# Patient Record
Sex: FEMALE | Race: WHITE | NOT HISPANIC OR LATINO | Employment: OTHER | URBAN - METROPOLITAN AREA
[De-identification: names, ages, dates, MRNs, and addresses within clinical notes are randomized per-mention and may not be internally consistent; named-entity substitution may affect disease eponyms.]

---

## 2021-05-18 ENCOUNTER — HOSPITAL ENCOUNTER (OUTPATIENT)
Dept: GENERAL RADIOLOGY | Facility: HOSPITAL | Age: 61
Discharge: HOME | End: 2021-05-18
Admitting: ORTHOPAEDIC SURGERY

## 2021-05-18 DIAGNOSIS — M25.532 LEFT WRIST PAIN: ICD-10-CM

## 2021-05-18 PROCEDURE — 73100 X-RAY EXAM OF WRIST: CPT

## 2021-06-29 ENCOUNTER — HOSPITAL ENCOUNTER (OUTPATIENT)
Dept: GENERAL RADIOLOGY | Facility: HOSPITAL | Age: 61
Discharge: HOME | End: 2021-06-29
Admitting: ORTHOPAEDIC SURGERY

## 2021-06-29 DIAGNOSIS — S52.532A CLOSED TRAUMATIC DISPLACED COLLES' FRACTURE OF LEFT RADIUS: ICD-10-CM

## 2021-06-29 PROCEDURE — 73100 X-RAY EXAM OF WRIST: CPT

## 2021-07-27 ENCOUNTER — HOSPITAL ENCOUNTER (OUTPATIENT)
Dept: GENERAL RADIOLOGY | Facility: HOSPITAL | Age: 61
Discharge: HOME | End: 2021-07-27
Admitting: ORTHOPAEDIC SURGERY

## 2021-07-27 DIAGNOSIS — S52.532D CLOSED COLLES' FRACTURE OF LEFT RADIUS WITH ROUTINE HEALING: ICD-10-CM

## 2021-07-27 PROCEDURE — 73100 X-RAY EXAM OF WRIST: CPT

## 2024-10-01 ENCOUNTER — OFFICE VISIT (OUTPATIENT)
Age: 64
End: 2024-10-01
Payer: MEDICARE

## 2024-10-01 VITALS
DIASTOLIC BLOOD PRESSURE: 68 MMHG | HEART RATE: 56 BPM | SYSTOLIC BLOOD PRESSURE: 155 MMHG | WEIGHT: 130 LBS | BODY MASS INDEX: 20.98 KG/M2 | OXYGEN SATURATION: 98 %

## 2024-10-01 DIAGNOSIS — I73.9 PVD (PERIPHERAL VASCULAR DISEASE) WITH CLAUDICATION: ICD-10-CM

## 2024-10-01 DIAGNOSIS — I65.22 STENOSIS OF LEFT CAROTID ARTERY: Primary | ICD-10-CM

## 2024-10-01 PROCEDURE — 1160F RVW MEDS BY RX/DR IN RCRD: CPT | Performed by: STUDENT IN AN ORGANIZED HEALTH CARE EDUCATION/TRAINING PROGRAM

## 2024-10-01 PROCEDURE — 99204 OFFICE O/P NEW MOD 45 MIN: CPT | Performed by: STUDENT IN AN ORGANIZED HEALTH CARE EDUCATION/TRAINING PROGRAM

## 2024-10-01 PROCEDURE — 1159F MED LIST DOCD IN RCRD: CPT | Performed by: STUDENT IN AN ORGANIZED HEALTH CARE EDUCATION/TRAINING PROGRAM

## 2024-10-01 RX ORDER — LOSARTAN POTASSIUM 100 MG/1
1 TABLET ORAL DAILY
COMMUNITY
Start: 2024-09-18

## 2024-10-01 RX ORDER — SIMVASTATIN 5 MG
5 TABLET ORAL NIGHTLY
Qty: 90 TABLET | Refills: 4 | Status: SHIPPED | OUTPATIENT
Start: 2024-10-01

## 2024-10-01 NOTE — PROGRESS NOTES
"Chief Complaint  Carotid Artery Disease (NP for carotid stenosis )    Patient referral for carotid stenosis    Subjective        Ilene Shipley presents to [facility] VASCULAR SURGERY  History of Present Illness    Patient is a kim 64-year-old lady with peripheral arterial disease previously underwent right iliofemoral endarterectomy with right iliac stent placement by Dr. Damon on 6/22/2016.     She is referred back to us now by Loren ROY due to new findings of carotid artery stenosis.  The patient had a carotid duplex performed at [facility] on 8/16/2024 which showed 50 to 69% stenosis in the left proximal ICA with no significant stenosis in the right ICA.     The patient reports she has not seen Dr. Damon since 2016.  She feels well today with no acute complaints.  She does not have any significant claudication.  She had tissue loss on her previous vascular intervention was performed and this has resolved.    She has no prior diagnosis of stroke.  She denies any episodes of unilateral arm or leg weakness, numbness, slurred speech, vision loss, or any other symptoms concerning for stroke.    Carotid duplex was ordered by her primary care doctor after noticing a carotid bruit.    Objective   Vital Signs:  /68 (BP Location: Left arm, Patient Position: Sitting, Cuff Size: Adult)   Pulse 56   Wt 59 kg (130 lb)   SpO2 98%   BMI 20.98 kg/m²   Estimated body mass index is 20.98 kg/m² as calculated from the following:    Height as of 6/22/16: 167.6 cm (66\").    Weight as of this encounter: 59 kg (130 lb).       BMI cannot be calculated due to outdated height or weight values.  Please input a current height/weight in Vitals and re-renter BMIFOLLOWUP in Note to pull in correct documentation based on BMI range.           Physical Exam   Result Review :                     Assessment and Plan     Kim 64-year-old lady with peripheral arterial disease and left " carotid stenosis who is referred as a new patient to me for evaluation.  Carotid stenosis appears to be 50 to 69% by duplex performed at outlying facility  Her carotid stenosis is asymptomatic and her peripheral arterial disease is currently asymptomatic as well.  She is currently on 325 mg aspirin daily.  She is 1/2 pack/day smoker.  I did discuss with her that the best thing she can do for herself in terms of her risk of stroke, worsening peripheral arterial disease, or heart attack would be to stop smoking and offered to refer her to the tobacco cessation clinic, or provide her with Chantix or nicotine patches myself and she declined all of these.  She does not seem to have much interest in smoking cessation at this time.      Currently there is no indication for intervention for her carotid disease or peripheral arterial disease.  I discussed the benefit of being on a statin with the patient.  She is worried about muscle cramps due to having had muscle cramps with hydrochlorothiazide.  We decided to start her on a very low-dose of simvastatin, 5 mg daily.  I have e-prescribed this to her pharmacy    Will see her back in 6 months with ABIs and carotid duplex for surveillance.    Patient verbalized understanding and agreement with this plan.    Diagnoses and all orders for this visit:    1. Stenosis of left carotid artery (Primary)  Assessment & Plan:  50-69% by duplex at [facility] on 8/16/24.       Orders:  -     simvastatin (ZOCOR) 5 MG tablet; Take 1 tablet by mouth Every Night.  Dispense: 90 tablet; Refill: 4  -     Duplex Carotid Ultrasound CAR; Future    2. PVD (peripheral vascular disease) with claudication  Assessment & Plan:  S/p iliac stent and R iliofemoral endarterectomy with re-do iliac stents by Dr. Damon in 2016.       Orders:  -     Doppler Arterial Multi Level Lower Extremity - Bilateral CAR; Future              Patient BMI noted. Educational material for patient for health risks  of being overweight added to patient's after visit summary.           Follow Up     Return in about 6 months (around 4/1/2025).  Patient was given instructions and counseling regarding her condition or for health maintenance advice. Please see specific information pulled into the AVS if appropriate.

## 2025-04-08 NOTE — PROGRESS NOTES
[facility] VASCULAR SURGERY    Chief Complaint  Carotid Artery Disease and Peripheral Vascular Disease    Subjective          History of Present Illness  Ilene Shipley is a 65 y.o. female with carotid stenosis and PAD. She presents to the office today for follow up.  She is accompanied by her .  She is followed by Dr. Wagoner. She has undergone the following vascular surgery interventions:    Right iliofemoral endarterectomy with right iliac stent placement by Dr. Damon on 6/22/2016    She denies any hospitalizations or new diagnosis since we last saw her. She denies any symptoms of TIA/CVA, including amaurosis fugax, slurred speech, or unilateral paresthesias or paralysis.  She is ambulatory, she tells me if she walks too long of a distance then her bilateral hips start to hurt.  1 does not bother her more than the other.  She tells me she is able to ride a bike and walk a little over half a mile before needing to stop and rest for a short time and then she is able to go again.  She denies any lifestyle limiting claudication.  She denies any rest pain or wounds to her lower extremities.  She is maintained on aspirin and a statin. She reports compliance with her medications. She is a smoker.  She is smoking about three-quarter pack of cigarettes daily.    Review of Systems   Musculoskeletal:  Negative for myalgias.   Skin:  Negative for skin lesions and wound.   Neurological:  Negative for facial asymmetry, speech difficulty and weakness.          Allergies: Patient has no known allergies.    Prior to Admission medications    Medication Sig Start Date End Date Taking? Authorizing Provider   aspirin 81 MG EC tablet Take 1 tablet by mouth Daily.    Pallavi Vincent MD   atorvastatin (LIPITOR) 80 MG tablet Take 1 tablet by mouth Daily. May take preop    Pallavi Vincent MD   hydroCHLOROthiazide (HYDRODIURIL) 25 MG tablet Take 1 tablet by mouth Daily. dont take preop    Carlton  "MD Pallavi   Misc Natural Products (PROSTATE HEALTH PO) Take  by mouth Daily. Prostate Health Capsule (si capsule once per day )    Provider, MD Pallavi         Objective   Vital Signs:  /62   Pulse 71   Ht 167.6 cm (65.98\")   Wt 59 kg (130 lb)   SpO2 97%   BMI 20.99 kg/m²   Estimated body mass index is 20.99 kg/m² as calculated from the following:    Height as of this encounter: 167.6 cm (65.98\").    Weight as of this encounter: 59 kg (130 lb).       Physical Exam  Vitals reviewed.   Constitutional:       General: She is not in acute distress.     Appearance: She is not ill-appearing.   Cardiovascular:      Rate and Rhythm: Normal rate.      Pulses:           Radial pulses are 2+ on the right side and 2+ on the left side.        Dorsalis pedis pulses are detected w/ Doppler on the right side and detected w/ Doppler on the left side.        Posterior tibial pulses are detected w/ Doppler on the right side and detected w/ Doppler on the left side.   Pulmonary:      Effort: Pulmonary effort is normal. No respiratory distress.   Skin:     General: Skin is warm and dry.   Neurological:      General: No focal deficit present.      Mental Status: She is alert and oriented to person, place, and time.      GCS: GCS eye subscore is 4. GCS verbal subscore is 5. GCS motor subscore is 6.          Result Review :    The following data was reviewed by: MANUELA Patel on 04/10/2025:  Doppler Arterial Multi Level Lower Extremity - Bilateral CAR (04/10/2025 10:40)   Right 0.69, TBI 0.28, digit pressure 39 mmHg  Left: 0.42, TBI 0.17, digit pressure 23 mmHg    Duplex Carotid Ultrasound CAR (04/10/2025 10:40)  Right: Over 70% stenosis in the right ICA, peak systolic velocity of 668 cm/s, ICA/CCA ratio of 8.2  Left: 50 to 69% stenosis in the left ICA, peak systolic velocity of 279 cm/s    Progress Notes by Stef Wagoner II, MD (10/01/2024 09:45)      Assessment and Plan     Diagnoses and all orders for " this visit:    1. Bilateral carotid artery stenosis (Primary)  -     CT Angiogram Neck; Future  -     CT Angiogram Head; Future    2. PVD (peripheral vascular disease) with claudication  -     Doppler Ankle Brachial Index Single Level CAR; Future    3. Tobacco abuse    BMI is within normal parameters. No other follow-up for BMI required.          Ilene Shipley is a 65 y.o. female with carotid stenosis and PAD. She denies symptoms of TIA/CVA. Her most recent carotid duplex shows over 70% stenosis in the right ICA and 50 to 69% stenosis in the left ICA, antegrade flow in the bilateral vertebral arteries.  Compared to previous imaging she has had a significant progression of carotid disease on the right, peak systolic velocity of 668 cm/s with a ratio of 8.2.  Recommend proceeding with a CTA of the neck to further evaluate her carotid vasculature, she should follow-up with Dr. Wagoner once this has been obtained.  Patient is agreeable to this plan. Surgery is not warranted unless stenosis reaches or exceeds 70% or patient becomes symptomatic.  We reviewed the signs and symptoms of stroke, she was instructed to seek emergency medical care if she experiences any of these. She verbalizes understanding.  In regards to her PAD, she is 9 years status post right iliac stent placement.  She denies any lower extremity ischemic symptoms.  Recent ANGY shows moderate arterial insufficiency in the right lower extremity at 0.69 with digit pressure of 39 mmHg, severe arterial insufficiency on the left at 0.42 with digit pressure of 23 mmHg.  Her ANGY on the right remained stable, ANGY on the left has decreased from 0.84, she remains completely asymptomatic.  In absence of ischemic symptoms we will continue to treat this medically and follow-up with an ANGY in 1 year.  She is maintained on appropriate antiplatelet, statin, and antihypertensive medications which we recommend she continue.  We discussed the importance of complete smoking  cessation, she is not completely ready at this time.  She was instructed to call our office if she had any questions or concerns.     Follow Up     Return in about 1 month (around 5/10/2025) for CTA head and neck and follow up with Dr. Wagoner.    Patient was given instructions and counseling regarding her condition or for health maintenance advice. Please see specific information pulled into the AVS if appropriate.     Carmen Garcia, APRN

## 2025-04-10 ENCOUNTER — HOSPITAL ENCOUNTER (OUTPATIENT)
Dept: CARDIOLOGY | Facility: HOSPITAL | Age: 65
Discharge: HOME | End: 2025-04-10
Payer: MEDICARE

## 2025-04-10 ENCOUNTER — OFFICE VISIT (OUTPATIENT)
Age: 65
End: 2025-04-10
Payer: MEDICARE

## 2025-04-10 VITALS
DIASTOLIC BLOOD PRESSURE: 62 MMHG | OXYGEN SATURATION: 97 % | HEART RATE: 71 BPM | BODY MASS INDEX: 20.89 KG/M2 | SYSTOLIC BLOOD PRESSURE: 136 MMHG | HEIGHT: 66 IN | WEIGHT: 130 LBS

## 2025-04-10 DIAGNOSIS — Z72.0 TOBACCO ABUSE: ICD-10-CM

## 2025-04-10 DIAGNOSIS — I73.9 PVD (PERIPHERAL VASCULAR DISEASE) WITH CLAUDICATION: ICD-10-CM

## 2025-04-10 DIAGNOSIS — I65.23 BILATERAL CAROTID ARTERY STENOSIS: Primary | ICD-10-CM

## 2025-04-10 DIAGNOSIS — I65.22 STENOSIS OF LEFT CAROTID ARTERY: ICD-10-CM

## 2025-04-10 PROCEDURE — 93923 UPR/LXTR ART STDY 3+ LVLS: CPT

## 2025-04-10 PROCEDURE — 93880 EXTRACRANIAL BILAT STUDY: CPT

## 2025-04-11 LAB
BH CV VAS PRELIMINARY FINDINGS SCRIPTING: 1
BH CV XLRA MEAS LEFT DIST CCA EDV: 37.6 CM/SEC
BH CV XLRA MEAS LEFT DIST CCA PSV: 126 CM/SEC
BH CV XLRA MEAS LEFT DIST ICA EDV: -40 CM/SEC
BH CV XLRA MEAS LEFT DIST ICA PSV: -115 CM/SEC
BH CV XLRA MEAS LEFT ICA/CCA RATIO: -2.21
BH CV XLRA MEAS LEFT MID ICA EDV: -38.4 CM/SEC
BH CV XLRA MEAS LEFT MID ICA PSV: -182 CM/SEC
BH CV XLRA MEAS LEFT PROX CCA EDV: 31.4 CM/SEC
BH CV XLRA MEAS LEFT PROX CCA PSV: 100 CM/SEC
BH CV XLRA MEAS LEFT PROX ECA PSV: -241 CM/SEC
BH CV XLRA MEAS LEFT PROX ICA EDV: -78.2 CM/SEC
BH CV XLRA MEAS LEFT PROX ICA PSV: -279 CM/SEC
BH CV XLRA MEAS LEFT PROX SCLA PSV: 184 CM/SEC
BH CV XLRA MEAS LEFT VERTEBRAL A EDV: -9.2 CM/SEC
BH CV XLRA MEAS LEFT VERTEBRAL A PSV: -31.6 CM/SEC
BH CV XLRA MEAS RIGHT DIST CCA EDV: 18.6 CM/SEC
BH CV XLRA MEAS RIGHT DIST CCA PSV: 73.9 CM/SEC
BH CV XLRA MEAS RIGHT DIST ICA EDV: -53.5 CM/SEC
BH CV XLRA MEAS RIGHT DIST ICA PSV: -170 CM/SEC
BH CV XLRA MEAS RIGHT ICA/CCA RATIO: 8.2
BH CV XLRA MEAS RIGHT PROX CCA EDV: 21.7 CM/SEC
BH CV XLRA MEAS RIGHT PROX CCA PSV: 82 CM/SEC
BH CV XLRA MEAS RIGHT PROX ECA EDV: -23.3 CM/SEC
BH CV XLRA MEAS RIGHT PROX ECA PSV: -156 CM/SEC
BH CV XLRA MEAS RIGHT PROX ICA EDV: 286 CM/SEC
BH CV XLRA MEAS RIGHT PROX ICA PSV: 668 CM/SEC
BH CV XLRA MEAS RIGHT PROX SCLA PSV: 127 CM/SEC
BH CV XLRA MEAS RIGHT VERTEBRAL A EDV: -31.4 CM/SEC
BH CV XLRA MEAS RIGHT VERTEBRAL A PSV: -95.6 CM/SEC
LEFT ARM BP: 135 MMHG
RIGHT ARM BP: 137 MMHG

## 2025-04-14 LAB
BH CV LOWER ARTERIAL LEFT ABI RATIO: 0.42
BH CV LOWER ARTERIAL LEFT CALF RATIO: 0.55
BH CV LOWER ARTERIAL LEFT DORSALIS PEDIS SYS MAX: 55
BH CV LOWER ARTERIAL LEFT GREAT TOE SYS MAX: 23
BH CV LOWER ARTERIAL LEFT LOW THIGH RATIO: 0.53
BH CV LOWER ARTERIAL LEFT LOW THIGH SYS MAX: 72
BH CV LOWER ARTERIAL LEFT POPLITEAL SYS MAX: 75
BH CV LOWER ARTERIAL LEFT POST TIBIAL SYS MAX: 58
BH CV LOWER ARTERIAL LEFT TBI RATIO: 0.17
BH CV LOWER ARTERIAL RIGHT ABI RATIO: 0.69
BH CV LOWER ARTERIAL RIGHT CALF RATIO: 0.73
BH CV LOWER ARTERIAL RIGHT DORSALIS PEDIS SYS MAX: 79
BH CV LOWER ARTERIAL RIGHT GREAT TOE SYS MAX: 39
BH CV LOWER ARTERIAL RIGHT LOW THIGH RATIO: 0.71
BH CV LOWER ARTERIAL RIGHT LOW THIGH SYS MAX: 97
BH CV LOWER ARTERIAL RIGHT POPLITEAL SYS MAX: 100
BH CV LOWER ARTERIAL RIGHT POST TIBIAL SYS MAX: 94
BH CV LOWER ARTERIAL RIGHT TBI RATIO: 0.28
UPPER ARTERIAL LEFT ARM BRACHIAL SYS MAX: 135
UPPER ARTERIAL RIGHT ARM BRACHIAL SYS MAX: 137

## 2025-05-10 ENCOUNTER — HOSPITAL ENCOUNTER (OUTPATIENT)
Dept: CT IMAGING | Facility: HOSPITAL | Age: 65
Discharge: HOME | End: 2025-05-10
Payer: MEDICARE

## 2025-05-10 DIAGNOSIS — I65.23 BILATERAL CAROTID ARTERY STENOSIS: ICD-10-CM

## 2025-05-10 PROCEDURE — 70498 CT ANGIOGRAPHY NECK: CPT

## 2025-05-10 PROCEDURE — 70496 CT ANGIOGRAPHY HEAD: CPT

## 2025-05-10 PROCEDURE — 25510000001 IOPAMIDOL PER 1 ML: Performed by: NURSE PRACTITIONER

## 2025-05-10 RX ORDER — IOPAMIDOL 755 MG/ML
100 INJECTION, SOLUTION INTRAVASCULAR
Status: COMPLETED | OUTPATIENT
Start: 2025-05-10 | End: 2025-05-10

## 2025-05-10 RX ADMIN — IOPAMIDOL 100 ML: 755 INJECTION, SOLUTION INTRAVENOUS at 08:31

## 2025-05-20 ENCOUNTER — OFFICE VISIT (OUTPATIENT)
Age: 65
End: 2025-05-20
Payer: MEDICARE

## 2025-05-20 VITALS
HEIGHT: 65 IN | SYSTOLIC BLOOD PRESSURE: 153 MMHG | BODY MASS INDEX: 21.66 KG/M2 | WEIGHT: 130 LBS | DIASTOLIC BLOOD PRESSURE: 69 MMHG

## 2025-05-20 DIAGNOSIS — I65.22 STENOSIS OF LEFT CAROTID ARTERY: ICD-10-CM

## 2025-05-20 DIAGNOSIS — I65.23 BILATERAL CAROTID ARTERY STENOSIS: Primary | ICD-10-CM

## 2025-05-20 DIAGNOSIS — I67.1 CEREBRAL ANEURYSM WITHOUT RUPTURE: ICD-10-CM

## 2025-05-20 RX ORDER — ASPIRIN 325 MG
325 TABLET ORAL DAILY
Qty: 903 TABLET | Refills: 3 | Status: SHIPPED | OUTPATIENT
Start: 2025-05-20

## 2025-05-20 RX ORDER — SIMVASTATIN 5 MG
5 TABLET ORAL NIGHTLY
Qty: 90 TABLET | Refills: 4 | Status: SHIPPED | OUTPATIENT
Start: 2025-05-20

## 2025-05-20 NOTE — PROGRESS NOTES
"Chief Complaint  Carotid Artery Disease    Follow up for carotid stenosis    Subjective        Ilene Shipley presents to [facility] VASCULAR SURGERY  History of Present Illness    Patient is a pleasant 65-year-old lady with peripheral arterial disease previously underwent right iliofemoral endarterectomy with right iliac stent placement by Dr. Damon on 6/22/2016.     The patient saw nurse practitioner Carmen Garcia in April with surveillance carotid duplex that showed high-grade stenosis in the right internal carotid artery over 70%.    She returns today for follow-up with CTA neck.    Patient doing well today with no acute complaints.  No interval symptoms concerning for stroke.  No lifestyle-limiting claudication symptoms.  Denies episodes of unilateral arm or leg weakness numbness slurred speech vision loss facial droop    Objective   Vital Signs:  /69 (BP Location: Right arm)   Ht 164.6 cm (64.8\")   Wt 59 kg (130 lb)   BMI 21.76 kg/m²   Estimated body mass index is 21.76 kg/m² as calculated from the following:    Height as of this encounter: 164.6 cm (64.8\").    Weight as of this encounter: 59 kg (130 lb).       BMI is within normal parameters. No other follow-up for BMI required.           Physical Exam   NAD  Respirations unlabored  PIERCE  No gross neurologic deficits  CN II-XII grossly intact.   Result Review :                      Narrative & Impression  CTA NECK, CTA HEAD     Clinical History:  Carotid stenosis     Comparison:  Carotid duplex 8/15/2024     TECHNIQUE:  Helical images thoracic inlet to vertex  100 mL Isovue-370  Coronal, sagittal reformats. Multi planar MIPS  CT images acquired with automatic exposure control for dose reduction  DLP: 224 mGy-cm     Findings:  NASCET Criteria utilized     CTA NECK     Aortic arch: No aneurysm or dissection.     Great vessel origins: No stenosis.     CCAs: Bilateral multifocal mild stenoses.     Cervical ICAs:   Right side: Narrowest " "area 1.8 mm. Normal distal artery: 5.9 mm. 70% stenosis.  Left side: Narrowest area 1.9 mm. Normal distal artery: 4.5 mm. 60% stenosis.     Vertebral Arteries: Right side patent, dominant, unremarkable. Left side small caliber proximally with slightly decreased relative enhancement, with return to normal caliber and enhancement within V3 segment, with critical stenosis distal V4 segment.     Lung Apices: Emphysema. Scarring.  Thyroid: Unremarkable.  Nodes: No enlarged nodes.  Bones: No acute bony abnormality.        CTA HEAD:     Aneurysms: 5 mm lesion right M1/M2 bifurcation.     Intracranial ICAs: Patent, unremarkable.  ACAs and their distal branches: Patent, unremarkable.  A-Comm: Identified.  Patent, unremarkable.  MCAs and their distal branches: Patent, unremarkable.  Basilar artery: Patent, unremarkable.  PCAs and their distal branches: Patent. Right P1 congenitally absent; fetal origin P-comm supply.  P-Comms: Identified. Patent, unremarkable.        IMPRESSION:     CTA NECK:  1.70% stenosis right ICA.  2.60% stenosis left ICA.  3.Proximal left vertebral artery long segment severe stenosis and/or chronic dissection, but patent. Additional critical stenosis distal V4 segment.  4.Common carotid arteries with bilateral multifocal mild stenoses.     Stenosis measured by NASCET criteria.        CTA HEAD:  1.5 mm aneurysm right M1/M2 bifurcation. Recommend neurosurgery or neurointerventional consultation.  2.No acute findings.     Electronically Signed: Rui Salgado MD    5/11/2025 11:29 AM EDT    Workstation ID: [id number]    L ICA:                      R ICA:                From Up To Date \"Unruptured Intracranial Aneurysms\" article, accessed 5/20/25:           Assessment and Plan     Pleasant 65-year-old lady with peripheral arterial disease and carotid stenosis who is here for follow up with CTA neck after carotid duplex showed elevated velocities concerning for >70% stenosis.     I have independently " reviewed and interpreted the images from the patient's CTA of her head and neck.  By my measurements the patient has less than 70% stenosis in both of her internal carotid arteries.  I suspect the elevated velocities in the right internal carotid are primarily due to tortuosity.  I have included images of the scan with my measurements above for reference.    Also noted is a 5 mm M1/M2 bifurcation aneurysm.    Considering these are asymptomatic lesions and associated with intracranial aneurysm I would not recommend intervention for the carotid stenosis at this time.    Patient will need to see neurosurgery for evaluation of her intracranial aneuysm.    Will plan to see the patient again in 6 months with repeat carotid duplex    Patient should stay on her aspirin and simvastatin in the interim.  Refills of these medications were sent to her pharmacy, the [facility].        Diagnoses and all orders for this visit:    1. Bilateral carotid artery stenosis (Primary)  -     Duplex Carotid Ultrasound CAR; Future  -     aspirin 325 MG tablet; Take 1 tablet by mouth Daily. PER MD, PT TO STAY OF PRIOR TO SURG  Dispense: 903 tablet; Refill: 3    2. Cerebral aneurysm without rupture  -     Ambulatory Referral to Neurosurgery    3. Stenosis of left carotid artery  -     simvastatin (ZOCOR) 5 MG tablet; Take 1 tablet by mouth Every Night.  Dispense: 90 tablet; Refill: 4                Patient BMI noted. Educational material for patient for health risks of being overweight added to patient's after visit summary.           Follow Up     No follow-ups on file.  Patient was given instructions and counseling regarding her condition or for health maintenance advice. Please see specific information pulled into the AVS if appropriate.

## 2025-06-02 NOTE — PROGRESS NOTES
"Subjective   Patient ID: Ilene Shipley is a 65 y.o. female is being seen for consultation today at the request of Stef Wagoner II, MD for  - Cerebral aneurysm without rupture   New patient, referred by vasucular surgery   CT angiogram neck/head completed on 05/10/2025     Ilene Shipley reports no complaints today.  She reports she had cataract surgery a few years ago.      Subjective:   History of Present Illness  History of Present Illness      Ilene is a very pleasant 65-year-old female with a significant history of smoking who presented with findings of a CTA I was performed by vascular surgery for evaluation of carotid stenosis.  She underwent a screening Doppler by her primary care physician which found this stenosis and Dr. Wagoner ordered the CTA of the head and neck which revealed a right MCA aneurysm.    Patient is not Maldivian or Khmer, she does have a history of smoking, she does have a history of hypertension, she does not have any abuse of stimulants in the past.    The following portions of the patient's history were reviewed and updated as appropriate: allergies, current medications, past family history, past medical history, past social history, past surgical history, and problem list.    Objective     Vitals:    06/11/25 1029   BP: 140/74   BP Location: Right arm   Patient Position: Sitting   Cuff Size: Adult   Pulse: 52   Resp: 16   Temp: 97.6 °F (36.4 °C)   TempSrc: Temporal   SpO2: 100%   Weight: 54.4 kg (120 lb)   Height: 164.6 cm (64.82\")   PainSc: 0-No pain     Body mass index is 20.08 kg/m².    Lab Results   Component Value Date    WBC 9.41 06/23/2016    HGB 9.9 (L) 06/23/2016    HCT 29.8 (L) 06/23/2016    MCV 91.4 06/23/2016     06/23/2016     Lab Results   Component Value Date    GLUCOSE 125 (H) 06/23/2016    CALCIUM 8.3 (L) 06/23/2016     06/23/2016    K 4.1 06/23/2016    CO2 18.1 (L) 06/23/2016     06/23/2016    BUN 8 06/23/2016    CREATININE 0.65 06/23/2016    BCR " 12.3 06/23/2016    ANIONGAP 16.9 06/23/2016       Physical Exam:    NAD  A&O3  Face symmetric, pupils equal round and reactive to light, EOMI  Motor:   5/5 BUE  5/5 BLE  Gait normal  No pronator drift      Ilene Shipley  reports that she has been smoking cigarettes. She started smoking about 33 years ago. She has a 27.8 pack-year smoking history. She has been exposed to tobacco smoke. She does not have any smokeless tobacco history on file.      Independent Review of Radiographic Studies:      I personally reviewed the images from the following studies.  Results      CTA of the head and neck performed 5/10/2025 shows 70% stenosis of the right ICA, 60% stenosis of the left ICA.  There is proximal left vertebral artery long segment stenosis.  There is critical stenosis of the distal left V4 segment, the right vertebral artery is patent.  There is a 5 mm lesion at the M1 anterior temporal artery origin.    Medical Decision Making:      I spent 45 minutes caring for Ilene on this date of service. This time includes time spent by me in the following activities:preparing for the visit, reviewing tests, obtaining and/or reviewing a separately obtained history, performing a medically appropriate examination and/or evaluation , counseling and educating the patient/family/caregiver, ordering medications, tests, or procedures, documenting information in the medical record, independently interpreting results and communicating that information with the patient/family/caregiver, and care coordination      Assessment & Plan   Assessment & Plan      Assessment & Plan  Cerebral aneurysm without rupture    Orders:    IR Arch & 3 Vessel Head; Future    Obtain Informed Consent; Standing    Insert Peripheral IV; Standing    Saline Lock & Maintain IV Access; Standing    sodium chloride 0.9 % flush 10 mL    sodium chloride 0.9 % flush 10 mL    sodium chloride 0.9 % infusion 40 mL    lidocaine (LMX) 4 % cream 1 Application    Occlusion and  stenosis of bilateral carotid arteries    Orders:    IR Arch & 3 Vessel Head; Future      Return for To hospital for diagnostic cerebral angiogram.           This note was completed using Dragon Dictation software.

## 2025-06-11 ENCOUNTER — OFFICE VISIT (OUTPATIENT)
Dept: NEUROSURGERY | Facility: CLINIC | Age: 65
End: 2025-06-11
Payer: MEDICARE

## 2025-06-11 VITALS
HEIGHT: 65 IN | DIASTOLIC BLOOD PRESSURE: 74 MMHG | HEART RATE: 52 BPM | RESPIRATION RATE: 16 BRPM | TEMPERATURE: 97.6 F | OXYGEN SATURATION: 100 % | WEIGHT: 120 LBS | SYSTOLIC BLOOD PRESSURE: 140 MMHG | BODY MASS INDEX: 19.99 KG/M2

## 2025-06-11 DIAGNOSIS — I67.1 CEREBRAL ANEURYSM WITHOUT RUPTURE: Primary | ICD-10-CM

## 2025-06-11 DIAGNOSIS — I65.23 OCCLUSION AND STENOSIS OF BILATERAL CAROTID ARTERIES: ICD-10-CM

## 2025-06-11 RX ORDER — SODIUM CHLORIDE 9 MG/ML
40 INJECTION, SOLUTION INTRAVENOUS AS NEEDED
OUTPATIENT
Start: 2025-06-11

## 2025-06-11 RX ORDER — SODIUM CHLORIDE 0.9 % (FLUSH) 0.9 %
10 SYRINGE (ML) INJECTION EVERY 12 HOURS SCHEDULED
OUTPATIENT
Start: 2025-06-11

## 2025-06-11 RX ORDER — LIDOCAINE 40 MG/G
1 CREAM TOPICAL AS NEEDED
OUTPATIENT
Start: 2025-06-11

## 2025-06-11 RX ORDER — SODIUM CHLORIDE 0.9 % (FLUSH) 0.9 %
10 SYRINGE (ML) INJECTION AS NEEDED
OUTPATIENT
Start: 2025-06-11

## 2025-06-11 NOTE — ASSESSMENT & PLAN NOTE
Orders:    IR Arch & 3 Vessel Head; Future    Obtain Informed Consent; Standing    Insert Peripheral IV; Standing    Saline Lock & Maintain IV Access; Standing    sodium chloride 0.9 % flush 10 mL    sodium chloride 0.9 % flush 10 mL    sodium chloride 0.9 % infusion 40 mL    lidocaine (LMX) 4 % cream 1 Application

## 2025-06-20 NOTE — PROGRESS NOTES
"Subjective   Patient ID: Ilene Shipley is a 65 y.o. female is here today for follow-up for  -Cerebral angiogram on 07/03/2025    Dx-Cerebral aneurysm without rupture   Last seen in office on 06/11/2025  Cerebral angiogram on 07/03/2025  No new imaging completed     Ilene Shipley reports no complaints this morning.        Subjective:   History of Present Illness  History of Present Illness      Ilene is doing very well 2 weeks postoperatively from her diagnostic cerebral angiogram.  She has no complaints.  We are here today to discuss the potential treatment of her right MCA aneurysm.  She is working on quitting smoking and is now only smoking 1 pack per 3 days    The following portions of the patient's history were reviewed and updated as appropriate: allergies, current medications, past family history, past medical history, past social history, past surgical history, and problem list.    Objective     Vitals:    07/21/25 0928   BP: 132/70   BP Location: Right arm   Patient Position: Sitting   Cuff Size: Adult   Pulse: 56   Resp: 16   Temp: 96.7 °F (35.9 °C)   TempSrc: Temporal   SpO2: 95%   Weight: 55.6 kg (122 lb 9.6 oz)   Height: 167.6 cm (65.98\")   PainSc: 0-No pain     Body mass index is 19.8 kg/m².    Lab Results   Component Value Date    WBC 9.41 06/23/2016    HGB 9.9 (L) 06/23/2016    HCT 29.8 (L) 06/23/2016    MCV 91.4 06/23/2016     06/23/2016     Lab Results   Component Value Date    GLUCOSE 125 (H) 06/23/2016    CALCIUM 8.3 (L) 06/23/2016     06/23/2016    K 4.1 06/23/2016    CO2 18.1 (L) 06/23/2016     06/23/2016    BUN 8 06/23/2016    CREATININE 1.00 07/03/2025    BCR 12.3 06/23/2016    ANIONGAP 16.9 06/23/2016       Physical Exam:    NAD  A&O3  Face symmetric, pupils equal round and reactive to light, EOMI  Motor:   5/5 BUE  5/5 BLE  Gait normal  No pronator drift      Ilene Shipley  reports that she has been smoking cigarettes. She started smoking about 33 years ago. She has a 27.9 " pack-year smoking history. She has been exposed to tobacco smoke. She does not have any smokeless tobacco history on file.          Medical Decision Making:      I spent 45 minutes caring for Ilene on this date of service. This time includes time spent by me in the following activities:preparing for the visit, reviewing tests, obtaining and/or reviewing a separately obtained history, performing a medically appropriate examination and/or evaluation , counseling and educating the patient/family/caregiver, ordering medications, tests, or procedures, documenting information in the medical record, independently interpreting results and communicating that information with the patient/family/caregiver, and care coordination      Assessment & Plan   Assessment & Plan      Assessment & Plan  Cerebral aneurysm without rupture    Orders:    CT Angiogram Neck; Future    CT Angiogram Head; Future  We discussed the risk and benefits of treating her right MCA bifurcation aneurysm.  It is currently measuring slightly less than 5 mm.  It does have an irregular shape with 2 daughter sacs.  I discussed that her continued use of nicotine as well as the overall irregular shape of the aneurysm does put her at greater risk for potential aneurysm rupture.  I am concerned that we would not get a good result endovascularly and she is at significant risk for  spasm and stroke during an open microsurgical dissection.  She also has COPD though she is not currently O2 dependent.  We discussed that the relative risk of treating the aneurysm is roughly equivalent to the relative risk of aneurysm rupture over the next 5 years.  Course of her lifetime.  We discussed the potential signs and symptoms of aneurysm rupture or central headache.  After significant discussion, the patient and her  decided that they would prefer observation at this time and if there are any changes to the aneurysms size or morphology, we would consider  treating.  They do understand that she is at risk for aneurysm rupture but the relative risk of treatment at this time is not something that they would like to take on.    We will see her back in clinic in 6 months with a CT of the head and neck.  Considering that her bilateral carotid stenosis is not symptomatic nor severe, and we are getting CT imaging, have instructed her to cancel her duplex scans as she is lives far away and this would be inconvenient.  I will reach out to her vascular surgeon, Dr. Wagoner, to let him know that we will be getting repeat CTAs.  Occlusion and stenosis of bilateral carotid arteries    Orders:    CT Angiogram Neck; Future    CT Angiogram Head; Future    Stenosis of left carotid artery  Due to the iCAD, I am going to increase her Zocor to 10 mg daily  Orders:    simvastatin (ZOCOR) 5 MG tablet; Take 2 tablets by mouth Every Night.      Return in about 6 months (around 1/21/2026) for Follow-up after imaging, CT of the head and neck.           This note was completed using Dragon Dictation software.

## 2025-07-02 NOTE — PROGRESS NOTES
07/03/25 0001   Pre-Procedure Phone Call   Procedure Time Verified Yes   Arrival Time 0700   Procedure Location Verified Yes   Medical History Reviewed Yes   NPO Status Reinforced Yes   Ride and Caregiver Arranged Yes   Phone Number for Ride/Caregiver Pa Shipley - [phone]   Patient Knows to Bring Current Medications Yes   Bring Outside Films Requested No

## 2025-07-03 ENCOUNTER — HOSPITAL ENCOUNTER (OUTPATIENT)
Dept: INTERVENTIONAL RADIOLOGY/VASCULAR | Facility: HOSPITAL | Age: 65
Discharge: HOME | End: 2025-07-03
Payer: MEDICARE

## 2025-07-03 VITALS
OXYGEN SATURATION: 99 % | WEIGHT: 120 LBS | TEMPERATURE: 98.2 F | HEART RATE: 55 BPM | SYSTOLIC BLOOD PRESSURE: 117 MMHG | BODY MASS INDEX: 19.29 KG/M2 | HEIGHT: 66 IN | RESPIRATION RATE: 18 BRPM | DIASTOLIC BLOOD PRESSURE: 88 MMHG

## 2025-07-03 DIAGNOSIS — I65.23 OCCLUSION AND STENOSIS OF BILATERAL CAROTID ARTERIES: ICD-10-CM

## 2025-07-03 DIAGNOSIS — I67.1 CEREBRAL ANEURYSM WITHOUT RUPTURE: ICD-10-CM

## 2025-07-03 LAB
CREAT BLDA-MCNC: 1 MG/DL (ref 0.6–1.3)
QT INTERVAL: 481 MS
QTC INTERVAL: 477 MS

## 2025-07-03 PROCEDURE — 25010000002 FENTANYL CITRATE (PF) 50 MCG/ML SOLUTION: Performed by: STUDENT IN AN ORGANIZED HEALTH CARE EDUCATION/TRAINING PROGRAM

## 2025-07-03 PROCEDURE — 93005 ELECTROCARDIOGRAM TRACING: CPT | Performed by: STUDENT IN AN ORGANIZED HEALTH CARE EDUCATION/TRAINING PROGRAM

## 2025-07-03 PROCEDURE — C1887 CATHETER, GUIDING: HCPCS

## 2025-07-03 PROCEDURE — 25010000002 HEPARIN (PORCINE) PER 1000 UNITS: Performed by: STUDENT IN AN ORGANIZED HEALTH CARE EDUCATION/TRAINING PROGRAM

## 2025-07-03 PROCEDURE — 25010000002 LIDOCAINE PF 1% 1 % SOLUTION: Performed by: STUDENT IN AN ORGANIZED HEALTH CARE EDUCATION/TRAINING PROGRAM

## 2025-07-03 PROCEDURE — 25010000002 MIDAZOLAM PER 1 MG: Performed by: STUDENT IN AN ORGANIZED HEALTH CARE EDUCATION/TRAINING PROGRAM

## 2025-07-03 PROCEDURE — C1894 INTRO/SHEATH, NON-LASER: HCPCS

## 2025-07-03 PROCEDURE — C1769 GUIDE WIRE: HCPCS

## 2025-07-03 PROCEDURE — 25510000002 IODIXANOL PER 1 ML: Performed by: STUDENT IN AN ORGANIZED HEALTH CARE EDUCATION/TRAINING PROGRAM

## 2025-07-03 PROCEDURE — 82565 ASSAY OF CREATININE: CPT

## 2025-07-03 RX ORDER — SODIUM CHLORIDE 9 MG/ML
40 INJECTION, SOLUTION INTRAVENOUS AS NEEDED
Status: DISCONTINUED | OUTPATIENT
Start: 2025-07-03 | End: 2025-07-04 | Stop reason: HOSPADM

## 2025-07-03 RX ORDER — IODIXANOL 320 MG/ML
150 INJECTION, SOLUTION INTRAVASCULAR
Status: COMPLETED | OUTPATIENT
Start: 2025-07-03 | End: 2025-07-03

## 2025-07-03 RX ORDER — FENTANYL CITRATE 50 UG/ML
INJECTION, SOLUTION INTRAMUSCULAR; INTRAVENOUS AS NEEDED
Status: COMPLETED | OUTPATIENT
Start: 2025-07-03 | End: 2025-07-03

## 2025-07-03 RX ORDER — SODIUM CHLORIDE 0.9 % (FLUSH) 0.9 %
10 SYRINGE (ML) INJECTION AS NEEDED
Status: DISCONTINUED | OUTPATIENT
Start: 2025-07-03 | End: 2025-07-04 | Stop reason: HOSPADM

## 2025-07-03 RX ORDER — SODIUM CHLORIDE 0.9 % (FLUSH) 0.9 %
10 SYRINGE (ML) INJECTION EVERY 12 HOURS SCHEDULED
Status: DISCONTINUED | OUTPATIENT
Start: 2025-07-03 | End: 2025-07-04 | Stop reason: HOSPADM

## 2025-07-03 RX ORDER — LIDOCAINE 40 MG/G
1 CREAM TOPICAL AS NEEDED
Status: DISCONTINUED | OUTPATIENT
Start: 2025-07-03 | End: 2025-07-04 | Stop reason: HOSPADM

## 2025-07-03 RX ORDER — HYDRALAZINE HYDROCHLORIDE 20 MG/ML
10 INJECTION INTRAMUSCULAR; INTRAVENOUS EVERY 6 HOURS PRN
Status: DISCONTINUED | OUTPATIENT
Start: 2025-07-03 | End: 2025-07-04 | Stop reason: HOSPADM

## 2025-07-03 RX ORDER — MIDAZOLAM HYDROCHLORIDE 1 MG/ML
INJECTION, SOLUTION INTRAMUSCULAR; INTRAVENOUS AS NEEDED
Status: COMPLETED | OUTPATIENT
Start: 2025-07-03 | End: 2025-07-03

## 2025-07-03 RX ORDER — LIDOCAINE HYDROCHLORIDE 10 MG/ML
INJECTION, SOLUTION EPIDURAL; INFILTRATION; INTRACAUDAL; PERINEURAL AS NEEDED
Status: COMPLETED | OUTPATIENT
Start: 2025-07-03 | End: 2025-07-03

## 2025-07-03 RX ORDER — ONDANSETRON 2 MG/ML
4 INJECTION INTRAMUSCULAR; INTRAVENOUS EVERY 6 HOURS PRN
Status: DISCONTINUED | OUTPATIENT
Start: 2025-07-03 | End: 2025-07-04 | Stop reason: HOSPADM

## 2025-07-03 RX ORDER — ACETAMINOPHEN 325 MG/1
650 TABLET ORAL EVERY 4 HOURS PRN
Status: DISCONTINUED | OUTPATIENT
Start: 2025-07-03 | End: 2025-07-04 | Stop reason: HOSPADM

## 2025-07-03 RX ADMIN — Medication 10 ML: at 08:42

## 2025-07-03 RX ADMIN — HEPARIN SODIUM: 1000 INJECTION INTRAVENOUS; SUBCUTANEOUS at 08:47

## 2025-07-03 RX ADMIN — Medication 10 ML: at 08:41

## 2025-07-03 RX ADMIN — MIDAZOLAM 0.5 MG: 1 INJECTION INTRAMUSCULAR; INTRAVENOUS at 08:46

## 2025-07-03 RX ADMIN — LIDOCAINE HYDROCHLORIDE 2 ML: 10 INJECTION, SOLUTION EPIDURAL; INFILTRATION; INTRACAUDAL; PERINEURAL at 08:45

## 2025-07-03 RX ADMIN — HEPARIN SODIUM: 1000 INJECTION INTRAVENOUS; SUBCUTANEOUS at 08:48

## 2025-07-03 RX ADMIN — HEPARIN SODIUM: 1000 INJECTION INTRAVENOUS; SUBCUTANEOUS at 08:49

## 2025-07-03 RX ADMIN — IODIXANOL 175 ML: 320 INJECTION, SOLUTION INTRAVASCULAR at 09:31

## 2025-07-03 RX ADMIN — FENTANYL CITRATE 50 MCG: 50 INJECTION, SOLUTION INTRAMUSCULAR; INTRAVENOUS at 08:46

## 2025-07-03 NOTE — DISCHARGE INSTRUCTIONS
[facility]  Cayden Monroe, Partha, Adore Strauss,[place], Bud  [address]  [place] [id number] [phone]      Discharge Instructions for Angiogram    Go home, rest and take it easy today.      You may experience some dizziness or memory loss from the anesthesia.  This may last for the next 24 hours.  Someone should plan on staying with you for the first 24 hours for your safety.    Do not make any important legal decisions or sign any legal papers for the next 24 hours.      Eat and drink lightly today.  Start off with liquids, jello, soup, crackers or other bland foods at first. You may advance your diet tomorrow as tolerated as long as you do not experience any nausea or vomiting.    You may resume routine medications including blood thinners. However, Glucophage should be not be started for 72 hours after the dye is given.      No lifting over 10 pounds and no strenuous activity for the next 2-3 days.    Try not to strain or bear down when your bowels move or when you empty your bladder.    Limit going up and down steps for 2 days.    No driving for the remainder of the day.  You may resume limited driving tomorrow if necessary.      You may shower tomorrow.  No bath or hot tubs for at least 2 days after the procedure.          Leave the pressure dressing on until tomorrow morning.  You may then take this off, as well as the small see through dressing with gauze tomorrow.  If it doesn’t come off easily, do not pull this off.  If it is stuck, shower and let the warm water loosen it before removal.       Check your groin dressing regularly for bleeding through the dressing (under the pressure dressing).  A small amount of blood contained by the gauze is normal; the whole dressing should not be filled with blood or leaking out under the sides.     If you experience bleeding through the gauze/clear sticky dressing, lay flat and have someone apply direct pressure for 15  minutes.  If bleeding has stopped after this, you may put a clean gauze and tape over the area.  Continue to lie flat for 1-2 hours.  If bleeding continues after 15 minutes of pressure, call us at the number listed above.  There is an MD available after hours.      If you experience heavy bleeding or large swelling, continue to hold firm pressure and              call 911.  Do not call the MD on call.      If you experience pain or discomfort you may take Tylenol or Ibuprofen, whichever you normally use for minor discomfort, unless otherwise instructed.        If the MD gives you a prescription for narcotic pain pills (Tylenol #3, Vicodin, Hydrocodone, Oxycodone or Percocet), you cannot drive a vehicle or operate machinery while taking these.     Severe pain is not expected after this procedure.  If you experience severe pain, please call our office at [phone].     Remember to contact our office for any of the following:    Fever > 101 degrees  Severe pain that cannot be controlled by taking your pain pills  Severe nausea or vomiting   Significant bleeding of your incisions  Drainage that has a bad smell or is yellow or green in appearance  Any other questions or concerns

## 2025-07-03 NOTE — PROGRESS NOTES
Bedside report given to CHERRI Jaeger. Site accessed soft, no drainage , TR Band in place with 12 cc in place .

## 2025-07-03 NOTE — NURSING NOTE
Patient arrived to bay [facility]  in IR preop area. PPE worn per patient and care providers for any bedside care.

## 2025-07-21 ENCOUNTER — OFFICE VISIT (OUTPATIENT)
Dept: NEUROSURGERY | Facility: CLINIC | Age: 65
End: 2025-07-21
Payer: MEDICARE

## 2025-07-21 VITALS
SYSTOLIC BLOOD PRESSURE: 132 MMHG | TEMPERATURE: 96.7 F | HEART RATE: 56 BPM | RESPIRATION RATE: 16 BRPM | WEIGHT: 122.6 LBS | HEIGHT: 66 IN | OXYGEN SATURATION: 95 % | BODY MASS INDEX: 19.7 KG/M2 | DIASTOLIC BLOOD PRESSURE: 70 MMHG

## 2025-07-21 DIAGNOSIS — I65.23 OCCLUSION AND STENOSIS OF BILATERAL CAROTID ARTERIES: ICD-10-CM

## 2025-07-21 DIAGNOSIS — I65.22 STENOSIS OF LEFT CAROTID ARTERY: ICD-10-CM

## 2025-07-21 DIAGNOSIS — I67.1 CEREBRAL ANEURYSM WITHOUT RUPTURE: Primary | ICD-10-CM

## 2025-07-21 PROCEDURE — 99215 OFFICE O/P EST HI 40 MIN: CPT | Performed by: STUDENT IN AN ORGANIZED HEALTH CARE EDUCATION/TRAINING PROGRAM

## 2025-07-21 RX ORDER — SIMVASTATIN 5 MG
10 TABLET ORAL NIGHTLY
Qty: 90 TABLET | Refills: 4 | Status: SHIPPED | OUTPATIENT
Start: 2025-07-21

## 2025-07-21 NOTE — ASSESSMENT & PLAN NOTE
Orders:    CT Angiogram Neck; Future    CT Angiogram Head; Future  We discussed the risk and benefits of treating her right MCA bifurcation aneurysm.  It is currently measuring slightly less than 5 mm.  It does have an irregular shape with 2 daughter sacs.  I discussed that her continued use of nicotine as well as the overall irregular shape of the aneurysm does put her at greater risk for potential aneurysm rupture.  I am concerned that we would not get a good result endovascularly and she is at significant risk for  spasm and stroke during an open microsurgical dissection.  She also has COPD though she is not currently O2 dependent.  We discussed that the relative risk of treating the aneurysm is roughly equivalent to the relative risk of aneurysm rupture over the next 5 years.  Course of her lifetime.  We discussed the potential signs and symptoms of aneurysm rupture or central headache.  After significant discussion, the patient and her  decided that they would prefer observation at this time and if there are any changes to the aneurysms size or morphology, we would consider treating.  They do understand that she is at risk for aneurysm rupture but the relative risk of treatment at this time is not something that they would like to take on.    We will see her back in clinic in 6 months with a CT of the head and neck.  Considering that her bilateral carotid stenosis is not symptomatic nor severe, and we are getting CT imaging, have instructed her to cancel her duplex scans as she is lives far away and this would be inconvenient.  I will reach out to her vascular surgeon, Dr. Wagoner, to let him know that we will be getting repeat CTAs.

## 2025-07-31 ENCOUNTER — TELEPHONE (OUTPATIENT)
Dept: NEUROSURGERY | Facility: CLINIC | Age: 65
End: 2025-07-31
Payer: MEDICARE

## 2025-07-31 RX ORDER — SIMVASTATIN 10 MG
10 TABLET ORAL NIGHTLY
Qty: 90 TABLET | Refills: 1 | Status: SHIPPED | OUTPATIENT
Start: 2025-07-31

## 2025-07-31 NOTE — TELEPHONE ENCOUNTER
Called and spoke to patient to confirm she picked up RX.  Patient states her pharmacy was not able to fill the simvastatin (ZOCOR) 5 MG tablet.  Advised patient we did receive a fax from [facility].  Will confirm with provider and fax order back to [facility].  Patient voiced understanding.